# Patient Record
Sex: FEMALE | Race: WHITE | NOT HISPANIC OR LATINO | Employment: FULL TIME | URBAN - METROPOLITAN AREA
[De-identification: names, ages, dates, MRNs, and addresses within clinical notes are randomized per-mention and may not be internally consistent; named-entity substitution may affect disease eponyms.]

---

## 2017-08-19 ENCOUNTER — NON-PROVIDER VISIT (OUTPATIENT)
Dept: URGENT CARE | Facility: PHYSICIAN GROUP | Age: 32
End: 2017-08-19

## 2017-08-19 DIAGNOSIS — Z11.1 PPD SCREENING TEST: ICD-10-CM

## 2017-08-19 PROCEDURE — 86580 TB INTRADERMAL TEST: CPT | Performed by: FAMILY MEDICINE

## 2017-08-21 ENCOUNTER — NON-PROVIDER VISIT (OUTPATIENT)
Dept: URGENT CARE | Facility: PHYSICIAN GROUP | Age: 32
End: 2017-08-21
Payer: COMMERCIAL

## 2017-08-21 LAB — TB WHEAL 3D P 5 TU DIAM: NORMAL MM

## 2017-11-05 ENCOUNTER — OFFICE VISIT (OUTPATIENT)
Dept: URGENT CARE | Facility: PHYSICIAN GROUP | Age: 32
End: 2017-11-05
Payer: COMMERCIAL

## 2017-11-05 VITALS
OXYGEN SATURATION: 97 % | BODY MASS INDEX: 18.83 KG/M2 | WEIGHT: 120 LBS | RESPIRATION RATE: 18 BRPM | HEIGHT: 67 IN | HEART RATE: 104 BPM | DIASTOLIC BLOOD PRESSURE: 80 MMHG | TEMPERATURE: 99.6 F | SYSTOLIC BLOOD PRESSURE: 120 MMHG

## 2017-11-05 DIAGNOSIS — K08.89 PAIN, DENTAL: ICD-10-CM

## 2017-11-05 DIAGNOSIS — K04.7 TOOTH INFECTION: ICD-10-CM

## 2017-11-05 PROCEDURE — 99204 OFFICE O/P NEW MOD 45 MIN: CPT | Performed by: NURSE PRACTITIONER

## 2017-11-05 RX ORDER — HYDROCODONE BITARTRATE AND ACETAMINOPHEN 5; 325 MG/1; MG/1
1 TABLET ORAL EVERY 8 HOURS PRN
Qty: 9 TAB | Refills: 0 | Status: SHIPPED | OUTPATIENT
Start: 2017-11-05 | End: 2017-11-08

## 2017-11-05 RX ORDER — CEPHALEXIN 500 MG/1
500 CAPSULE ORAL 4 TIMES DAILY
Qty: 20 CAP | Refills: 0 | Status: SHIPPED | OUTPATIENT
Start: 2017-11-05 | End: 2020-09-15

## 2017-11-05 RX ORDER — KETOROLAC TROMETHAMINE 30 MG/ML
60 INJECTION, SOLUTION INTRAMUSCULAR; INTRAVENOUS ONCE
Status: COMPLETED | OUTPATIENT
Start: 2017-11-05 | End: 2017-11-05

## 2017-11-05 RX ADMIN — KETOROLAC TROMETHAMINE 60 MG: 30 INJECTION, SOLUTION INTRAMUSCULAR; INTRAVENOUS at 10:05

## 2017-11-05 ASSESSMENT — ENCOUNTER SYMPTOMS
MYALGIAS: 1
SORE THROAT: 0
VOMITING: 0
NAUSEA: 0
WEAKNESS: 0
CHILLS: 0
COUGH: 0
HEADACHES: 0
FEVER: 0

## 2017-11-05 NOTE — PROGRESS NOTES
Subjective:      Ely Faustin is a 32 y.o. female who presents with Dental Pain (poss infected tooth x2 days)            HPI  Ely is a 32 year old female who is here for tooth infection and pain. Taking Advil but not helping pain. Has dental appointment in 3 days. C/o mouth, jaw, neck pain.    PMH:  has no past medical history on file.  MEDS:   Current Outpatient Prescriptions:   •  hydrocodone-acetaminophen (NORCO) 5-325 MG Tab per tablet, Take 1 Tab by mouth every 8 hours as needed for up to 3 days., Disp: 9 Tab, Rfl: 0  •  cephALEXin (KEFLEX) 500 MG Cap, Take 1 Cap by mouth 4 times a day., Disp: 20 Cap, Rfl: 0  •  albuterol (VENTOLIN OR PROVENTIL) 108 (90 BASE) MCG/ACT Aero Soln inhalation aerosol, Inhale 2 Puffs by mouth every 6 hours as needed for Shortness of Breath., Disp: 1 Inhaler, Rfl: 3  •  Naproxen Sodium (ALEVE) 220 MG CAPS, Take 2 Caps by mouth., Disp: , Rfl:     Current Facility-Administered Medications:   •  ketorolac (TORADOL) injection 60 mg, 60 mg, Intramuscular, Once, Sue Amor, F.N.P.  ALLERGIES:   Allergies   Allergen Reactions   • Nkda [No Known Drug Allergy]      SURGHX:   Past Surgical History:   Procedure Laterality Date   • TUBAL COAGULATION LAPAROSCOPIC BILATERAL  2/24/2012    Performed by BETINA JOHNSON at SURGERY SAME DAY [facility]   • APPENDECTOMY LAPAROSCOPIC  11/13/08    Performed by LUIS ANGEL JARAMILLO at SURGERY [facility]S   • APPENDECTOMY  november 08     SOCHX:  reports that she has been smoking Cigarettes.  She has a 0.75 pack-year smoking history. She has never used smokeless tobacco. She reports that she does not drink alcohol or use drugs.  FH: Family history was reviewed, no pertinent findings to report    Review of Systems   Constitutional: Negative for chills, fever and malaise/fatigue.   HENT: Negative for congestion, ear pain and sore throat.    Respiratory: Negative for cough.    Gastrointestinal: Negative for nausea and vomiting.  "  Musculoskeletal: Positive for myalgias.   Neurological: Negative for weakness and headaches.   All other systems reviewed and are negative.         Objective:     /80   Pulse (!) 104   Temp 37.6 °C (99.6 °F)   Resp 18   Ht 1.702 m (5' 7\")   Wt 54.4 kg (120 lb)   SpO2 97%   BMI 18.79 kg/m²      Physical Exam   Constitutional: She is oriented to person, place, and time. She appears well-developed and well-nourished. She is active and cooperative.  Non-toxic appearance. She does not have a sickly appearance. She appears ill. No distress.   HENT:   Head: Normocephalic.   Mouth/Throat: Oropharynx is clear and moist and mucous membranes are normal. Dental abscesses and dental caries present. No uvula swelling.       Eyes: Conjunctivae and EOM are normal. Pupils are equal, round, and reactive to light.   Neck: Normal range of motion. Neck supple.   Cardiovascular: Normal rate.    Pulmonary/Chest: Effort normal.   Musculoskeletal: Normal range of motion.   Lymphadenopathy:     She has no cervical adenopathy.   Neurological: She is alert and oriented to person, place, and time.   Skin: Skin is warm and dry. She is not diaphoretic.   Psychiatric: Her mood appears anxious.   Crying in exam room due to pain   Vitals reviewed.         [facility] Aware web site evaluation: I have obtained and reviewed patient utilization report from [facility] pharmacy database prior to writing prescription for controlled substance II, III or IV. Based on the report and my clinical assessment the prescription is medically necessary      Assessment/Plan:     1. Pain, dental    - ketorolac (TORADOL) injection 60 mg; 2 mL by Intramuscular route Once.  - hydrocodone-acetaminophen (NORCO) 5-325 MG Tab per tablet; Take 1 Tab by mouth every 8 hours as needed for up to 3 days.  Dispense: 9 Tab; Refill: 0    2. Tooth infection    - cephALEXin (KEFLEX) 500 MG Cap; Take 1 Cap by mouth 4 times a day.  Dispense: 20 Cap; Refill: 0    May use " NSAID prn for mouth discomfort  May use prescribed pain medications as directed  May use salt water gargle prn for any mouth discomfort  Contact dentist for mouth evaluation and treatment- keep appointment in 3 days

## 2019-11-02 ENCOUNTER — OFFICE VISIT (OUTPATIENT)
Dept: URGENT CARE | Facility: PHYSICIAN GROUP | Age: 34
End: 2019-11-02

## 2019-11-02 DIAGNOSIS — Z11.1 PPD SCREENING TEST: ICD-10-CM

## 2019-11-04 ENCOUNTER — NON-PROVIDER VISIT (OUTPATIENT)
Dept: URGENT CARE | Facility: PHYSICIAN GROUP | Age: 34
End: 2019-11-04

## 2019-11-04 LAB — TB WHEAL 3D P 5 TU DIAM: NORMAL MM

## 2019-11-05 NOTE — NON-PROVIDER
Ely Faustin is a 34 y.o. female here for a non-provider visit for PPD reading -- Step 1 of 1.      1.  Resulted in Epic under enter/edit results? Yes   2.  TB evaluation questionnaire scanned into chart and original given to patient?Yes      3. Was induration greater than 0 mm? No.    Routed to PCP? No

## 2020-09-15 ENCOUNTER — OFFICE VISIT (OUTPATIENT)
Dept: URGENT CARE | Facility: PHYSICIAN GROUP | Age: 35
End: 2020-09-15
Payer: COMMERCIAL

## 2020-09-15 ENCOUNTER — HOSPITAL ENCOUNTER (OUTPATIENT)
Facility: MEDICAL CENTER | Age: 35
End: 2020-09-15
Attending: NURSE PRACTITIONER
Payer: COMMERCIAL

## 2020-09-15 VITALS
SYSTOLIC BLOOD PRESSURE: 118 MMHG | DIASTOLIC BLOOD PRESSURE: 76 MMHG | OXYGEN SATURATION: 96 % | BODY MASS INDEX: 20.89 KG/M2 | RESPIRATION RATE: 16 BRPM | WEIGHT: 130 LBS | TEMPERATURE: 97.6 F | HEIGHT: 66 IN | HEART RATE: 82 BPM

## 2020-09-15 DIAGNOSIS — J40 BRONCHITIS: ICD-10-CM

## 2020-09-15 DIAGNOSIS — R53.83 OTHER FATIGUE: ICD-10-CM

## 2020-09-15 DIAGNOSIS — R50.9 FEVER, UNSPECIFIED FEVER CAUSE: ICD-10-CM

## 2020-09-15 DIAGNOSIS — J45.20 MILD INTERMITTENT ASTHMA, UNSPECIFIED WHETHER COMPLICATED: ICD-10-CM

## 2020-09-15 DIAGNOSIS — M79.10 MYALGIA: ICD-10-CM

## 2020-09-15 LAB
COVID ORDER STATUS COVID19: NORMAL
FLUAV+FLUBV AG SPEC QL IA: NORMAL
INT CON NEG: NEGATIVE
INT CON POS: POSITIVE

## 2020-09-15 PROCEDURE — 87804 INFLUENZA ASSAY W/OPTIC: CPT | Performed by: NURSE PRACTITIONER

## 2020-09-15 PROCEDURE — U0003 INFECTIOUS AGENT DETECTION BY NUCLEIC ACID (DNA OR RNA); SEVERE ACUTE RESPIRATORY SYNDROME CORONAVIRUS 2 (SARS-COV-2) (CORONAVIRUS DISEASE [COVID-19]), AMPLIFIED PROBE TECHNIQUE, MAKING USE OF HIGH THROUGHPUT TECHNOLOGIES AS DESCRIBED BY CMS-2020-01-R: HCPCS

## 2020-09-15 PROCEDURE — 99214 OFFICE O/P EST MOD 30 MIN: CPT | Performed by: NURSE PRACTITIONER

## 2020-09-15 RX ORDER — AMOXICILLIN AND CLAVULANATE POTASSIUM 875; 125 MG/1; MG/1
1 TABLET, FILM COATED ORAL 2 TIMES DAILY
Qty: 14 TAB | Refills: 0 | Status: SHIPPED | OUTPATIENT
Start: 2020-09-15 | End: 2020-09-22

## 2020-09-15 RX ORDER — ALBUTEROL SULFATE 90 UG/1
2 AEROSOL, METERED RESPIRATORY (INHALATION) EVERY 6 HOURS PRN
Qty: 8.5 G | Refills: 0 | Status: SHIPPED | OUTPATIENT
Start: 2020-09-15

## 2020-09-15 ASSESSMENT — ENCOUNTER SYMPTOMS
COUGH: 1
WEAKNESS: 0
SINUS PAIN: 0
HEADACHES: 0
VOMITING: 0
FEVER: 0
ABDOMINAL PAIN: 0
SHORTNESS OF BREATH: 1
MYALGIAS: 0
SPUTUM PRODUCTION: 0
ORTHOPNEA: 0
WHEEZING: 1
NAUSEA: 0
CHILLS: 0
SORE THROAT: 0
DIZZINESS: 0

## 2020-09-16 LAB
SARS-COV-2 RNA RESP QL NAA+PROBE: NOTDETECTED
SPECIMEN SOURCE: NORMAL

## 2020-09-16 NOTE — PROGRESS NOTES
Subjective:   Ely Faustin is a 34 y.o. female who presents for Cough (cough, sob x1 wk)       Cough  Associated symptoms include shortness of breath and wheezing. Pertinent negatives include no chest pain, chills, ear pain, fever, headaches, myalgias, rash or sore throat. Her past medical history is significant for environmental allergies.     Pt presents for evaluation of a new problem, reports dry cough, bilateral upper chest pain, and occasional shortness of breath x1 week.  Has noted environmental allergens, does not take any medication on a daily basis.  States that her allergy symptoms have been worse lately with a smoky environment.  He is on albuterol inhaler, has been using more often to alleviate her symptoms, she reports her building mildly helpful.  Works at a local , has no known possible ill contacts. No known ill contacts at home.    Review of Systems   Constitutional: Negative for chills, fever and malaise/fatigue.   HENT: Positive for congestion. Negative for ear pain, sinus pain and sore throat.    Respiratory: Positive for cough, shortness of breath and wheezing. Negative for sputum production.    Cardiovascular: Negative for chest pain, orthopnea and leg swelling.   Gastrointestinal: Negative for abdominal pain, nausea and vomiting.   Genitourinary: Negative for dysuria.   Musculoskeletal: Negative for myalgias.   Skin: Negative for rash.   Neurological: Negative for dizziness, weakness and headaches.   Endo/Heme/Allergies: Positive for environmental allergies.   All other systems reviewed and are negative.      MEDS:   Current Outpatient Medications:   •  albuterol 108 (90 Base) MCG/ACT Aero Soln inhalation aerosol, Inhale 2 Puffs by mouth every 6 hours as needed for Shortness of Breath., Disp: 8.5 g, Rfl: 0  •  amoxicillin-clavulanate (AUGMENTIN) 875-125 MG Tab, Take 1 Tab by mouth 2 times a day for 7 days., Disp: 14 Tab, Rfl: 0  •  albuterol (VENTOLIN OR PROVENTIL) 108 (90  "BASE) MCG/ACT Aero Soln inhalation aerosol, Inhale 2 Puffs by mouth every 6 hours as needed for Shortness of Breath. (Patient not taking: Reported on 9/15/2020), Disp: 1 Inhaler, Rfl: 3  ALLERGIES:   Allergies   Allergen Reactions   • Nkda [No Known Drug Allergy]        Patient's PMH, SocHx, SurgHx, FamHx, Drug allergies and medications were reviewed.     Objective:   /76 (BP Location: Right arm, Patient Position: Sitting, BP Cuff Size: Small adult)   Pulse 82   Temp 36.4 °C (97.6 °F) (Temporal)   Resp 16   Ht 1.676 m (5' 6\")   Wt 59 kg (130 lb)   SpO2 96%   BMI 20.98 kg/m²     Physical Exam  Vitals signs and nursing note reviewed.   Constitutional:       General: She is awake.      Appearance: Normal appearance. She is well-developed and normal weight.   HENT:      Head: Normocephalic and atraumatic.      Right Ear: Tympanic membrane, ear canal and external ear normal.      Left Ear: Tympanic membrane, ear canal and external ear normal.      Nose: Nose normal.      Mouth/Throat:      Mouth: Mucous membranes are moist.      Pharynx: Oropharynx is clear.   Eyes:      Extraocular Movements: Extraocular movements intact.      Conjunctiva/sclera: Conjunctivae normal.      Pupils: Pupils are equal, round, and reactive to light.   Neck:      Musculoskeletal: Full passive range of motion without pain, normal range of motion and neck supple.      Thyroid: No thyromegaly.      Trachea: Trachea normal.   Cardiovascular:      Rate and Rhythm: Normal rate and regular rhythm.      Pulses: Normal pulses.      Heart sounds: Normal heart sounds, S1 normal and S2 normal.   Pulmonary:      Effort: Pulmonary effort is normal. No respiratory distress.      Breath sounds: Decreased air movement present. Examination of the right-upper field reveals wheezing. Examination of the left-upper field reveals wheezing. Examination of the right-middle field reveals decreased breath sounds and wheezing. Decreased breath sounds and " wheezing present. No rhonchi or rales.   Abdominal:      General: Bowel sounds are normal.      Palpations: Abdomen is soft.   Musculoskeletal: Normal range of motion.   Lymphadenopathy:      Cervical: No cervical adenopathy.   Skin:     General: Skin is warm and dry.      Capillary Refill: Capillary refill takes less than 2 seconds.   Neurological:      General: No focal deficit present.      Mental Status: She is alert and oriented to person, place, and time.      Gait: Gait is intact.   Psychiatric:         Attention and Perception: Attention and perception normal.         Mood and Affect: Mood normal.         Speech: Speech normal.         Behavior: Behavior normal. Behavior is cooperative.         Thought Content: Thought content normal.         Judgment: Judgment normal.         Assessment/Plan:   Assessment      1. Mild intermittent asthma, unspecified whether complicated  - POCT Influenza A/B  - COVID/SARS COV-2 PCR; Future  - albuterol 108 (90 Base) MCG/ACT Aero Soln inhalation aerosol; Inhale 2 Puffs by mouth every 6 hours as needed for Shortness of Breath.  Dispense: 8.5 g; Refill: 0  - amoxicillin-clavulanate (AUGMENTIN) 875-125 MG Tab; Take 1 Tab by mouth 2 times a day for 7 days.  Dispense: 14 Tab; Refill: 0  - REFERRAL TO FOLLOW-UP WITH PRIMARY CARE    2. Bronchitis  - POCT Influenza A/B  - COVID/SARS COV-2 PCR; Future  - albuterol 108 (90 Base) MCG/ACT Aero Soln inhalation aerosol; Inhale 2 Puffs by mouth every 6 hours as needed for Shortness of Breath.  Dispense: 8.5 g; Refill: 0  - amoxicillin-clavulanate (AUGMENTIN) 875-125 MG Tab; Take 1 Tab by mouth 2 times a day for 7 days.  Dispense: 14 Tab; Refill: 0    3. Fever, unspecified fever cause  - POCT Influenza A/B  - COVID/SARS COV-2 PCR; Future    4. Myalgia  - POCT Influenza A/B  - COVID/SARS COV-2 PCR; Future    5. Other fatigue  - POCT Influenza A/B  - COVID/SARS COV-2 PCR; Future    Review of negative test results taken in the office today.  Begin medications as listed.  Supportive care options also discussed.  Due to COVID testing, patient advised of quarantine until test results return.  Differential diagnosis, natural history, and indications for immediate follow-up were discussed.     Return to urgent care clinic or PCP if current symptoms do not improve and/or worsening symptoms occur. Advised of signs and symptoms which would warrant further evaluation and /or emergent evaluation in ER.  All questions answered and the patient agrees to the plan of care.

## 2020-09-17 ENCOUNTER — TELEPHONE (OUTPATIENT)
Dept: URGENT CARE | Facility: PHYSICIAN GROUP | Age: 35
End: 2020-09-17

## 2022-07-02 ENCOUNTER — NON-PROVIDER VISIT (OUTPATIENT)
Dept: URGENT CARE | Facility: PHYSICIAN GROUP | Age: 37
End: 2022-07-02

## 2022-07-02 DIAGNOSIS — Z11.1 PPD SCREENING TEST: ICD-10-CM

## 2022-07-02 PROCEDURE — 86580 TB INTRADERMAL TEST: CPT | Performed by: FAMILY MEDICINE

## 2022-07-03 NOTE — NON-PROVIDER
Ely Faustin is a 36 y.o. female here for a non-provider visit for PPD placement -- Step 1 of 1    Reason for PPD:  work requirement    1. TB evaluation questionnaire completed by patient? Yes      -  If any answers marked yes did you contact a provider prior to placing? No  2.  Patient notified to return to clinic for reading on: Monday 7/4/22 after 4:05pm and before Tuesday 7/5/22 before 4:05pm  3.  PPD Placement documentation completed on TB evaluation questionnaire? Yes  4.  Location of TB evaluation questionnaire filed:

## 2022-07-05 ENCOUNTER — NON-PROVIDER VISIT (OUTPATIENT)
Dept: URGENT CARE | Facility: PHYSICIAN GROUP | Age: 37
End: 2022-07-05

## 2022-07-05 DIAGNOSIS — Z11.1 ENCOUNTER FOR PPD SKIN TEST READING: ICD-10-CM

## 2022-07-05 LAB — TB WHEAL 3D P 5 TU DIAM: NORMAL MM

## 2023-10-05 ENCOUNTER — TELEPHONE (OUTPATIENT)
Dept: MEDICAL GROUP | Facility: PHYSICIAN GROUP | Age: 38
End: 2023-10-05
Payer: COMMERCIAL

## 2023-10-05 RX ORDER — PREDNISONE 20 MG/1
60 TABLET ORAL DAILY
Qty: 15 TABLET | Refills: 0 | Status: SHIPPED | OUTPATIENT
Start: 2023-10-05 | End: 2023-10-10

## 2024-08-09 ENCOUNTER — NON-PROVIDER VISIT (OUTPATIENT)
Dept: URGENT CARE | Facility: PHYSICIAN GROUP | Age: 39
End: 2024-08-09

## 2024-08-09 DIAGNOSIS — Z11.1 PPD SCREENING TEST: ICD-10-CM

## 2024-08-09 PROCEDURE — 86580 TB INTRADERMAL TEST: CPT | Performed by: PHYSICIAN ASSISTANT

## 2024-08-09 NOTE — PROGRESS NOTES
Ely Faustin is a 38 y.o. female here for a non-provider visit for PPD placement -- Step 1 of 1    Reason for PPD:  work requirement    1. TB evaluation questionnaire completed by patient? Yes      -  If any answers marked yes did you contact a provider prior to placing? No  2.  Patient notified to return to clinic for reading on: 8-11-24 After 1:38 PM Sunday - 8-12-24 Before 1:38 PM Monday  3.  PPD Placement documentation completed on TB evaluation questionnaire? Yes  4.  Location of TB evaluation questionnaire filed: [facility]

## 2024-08-12 ENCOUNTER — NON-PROVIDER VISIT (OUTPATIENT)
Dept: URGENT CARE | Facility: PHYSICIAN GROUP | Age: 39
End: 2024-08-12

## 2024-08-12 LAB — TB WHEAL 3D P 5 TU DIAM: NORMAL MM

## 2024-08-12 PROCEDURE — 99999 PR NO CHARGE: CPT | Performed by: FAMILY MEDICINE

## 2024-08-12 NOTE — PROGRESS NOTES
Ely Faustin is a 38 y.o. female here for a non-provider visit for PPD reading -- Step 1 of 1.      1.  Resulted in Epic under enter/edit results? Yes   2.  TB evaluation questionnaire scanned into chart and original given to patient?Yes      3. Was induration greater than 0 mm? No.      Routed to PCP? No